# Patient Record
Sex: FEMALE | Race: WHITE | NOT HISPANIC OR LATINO | Employment: FULL TIME | URBAN - NONMETROPOLITAN AREA
[De-identification: names, ages, dates, MRNs, and addresses within clinical notes are randomized per-mention and may not be internally consistent; named-entity substitution may affect disease eponyms.]

---

## 2021-12-28 PROCEDURE — U0004 COV-19 TEST NON-CDC HGH THRU: HCPCS | Performed by: FAMILY MEDICINE

## 2022-01-11 ENCOUNTER — OFFICE VISIT (OUTPATIENT)
Dept: OBSTETRICS AND GYNECOLOGY | Facility: CLINIC | Age: 27
End: 2022-01-11

## 2022-01-11 VITALS
WEIGHT: 153 LBS | BODY MASS INDEX: 25.49 KG/M2 | HEIGHT: 65 IN | SYSTOLIC BLOOD PRESSURE: 110 MMHG | DIASTOLIC BLOOD PRESSURE: 68 MMHG

## 2022-01-11 DIAGNOSIS — Z30.8 ENCOUNTER FOR OTHER CONTRACEPTIVE MANAGEMENT: ICD-10-CM

## 2022-01-11 DIAGNOSIS — N94.6 DYSMENORRHEA: ICD-10-CM

## 2022-01-11 DIAGNOSIS — N92.1 MENORRHAGIA WITH IRREGULAR CYCLE: ICD-10-CM

## 2022-01-11 DIAGNOSIS — N89.8 VAGINAL DISCHARGE: Primary | ICD-10-CM

## 2022-01-11 PROCEDURE — 99204 OFFICE O/P NEW MOD 45 MIN: CPT | Performed by: OBSTETRICS & GYNECOLOGY

## 2022-01-11 RX ORDER — LEVONORGESTREL / ETHINYL ESTRADIOL AND ETHINYL ESTRADIOL 150-30(84)
1 KIT ORAL DAILY
Qty: 84 EACH | Refills: 3 | Status: SHIPPED | OUTPATIENT
Start: 2022-01-11 | End: 2022-07-26 | Stop reason: SDUPTHER

## 2022-01-11 RX ORDER — SERTRALINE HYDROCHLORIDE 100 MG/1
TABLET, FILM COATED ORAL
COMMUNITY
Start: 2022-01-03

## 2022-01-11 RX ORDER — BUSPIRONE HYDROCHLORIDE 5 MG/1
TABLET ORAL
COMMUNITY
Start: 2022-01-03

## 2022-01-12 NOTE — PROGRESS NOTES
Chief Complaint  Vaginal Discharge (patient states that since starting her period, she has had a discharge, concerned if it is normal ) and birthcontrol consult (patient is wanting to start Ashlyna )     History of Present Illness:  Patient is 26 y.o.  who presents to [facility] is a new patient for evaluation of a vaginal discharge as well as contraception.  Patient reports menarche at the age of 15.  She gives a history of menorrhagia with heavy menstrual cycles changing a tampon every 3 hours.  Patient also has complaints of severe pain starting the day prior to and during her menstrual cycle.  Patient reports at times the pain is excruciating in nature.  Patient also reports having vaginal discharge which has been present since menarche.  Patient reports she has been treated for yeast infection in the past.  Patient reports the discharge is intermittent in nature.  She denies any itching or irritation at present.  Patient reports having a Pap smear or cultures 5 years ago.  She denies any odor to the discharge.  Patient sees Dr. Rhodes for her primary care.  Patient is on BuSpar and Zoloft for her anxiety.    History  Past Medical History:   Diagnosis Date   • Anxiety      Current Outpatient Medications on File Prior to Visit   Medication Sig Dispense Refill   • busPIRone (BUSPAR) 5 MG tablet      • sertraline (ZOLOFT) 100 MG tablet        No current facility-administered medications on file prior to visit.     No Known Allergies  Past Surgical History:   Procedure Laterality Date   • TONSILLECTOMY     • WISDOM TOOTH EXTRACTION       History reviewed. No pertinent family history.  Social History     Socioeconomic History   • Marital status: Single   Tobacco Use   • Smoking status: Never Smoker   • Smokeless tobacco: Never Used   Vaping Use   • Vaping Use: Never used   Substance and Sexual Activity   • Alcohol use: Never   • Drug use: Never   • Sexual activity: Yes     Partners:  "Female       Physical Examination:  Vital Signs: /68   Ht 165.1 cm (65\")   Wt 69.4 kg (153 lb)   BMI 25.46 kg/m²     General Appearance: alert, appears stated age, and cooperative  Breasts: Not performed.  Abdomen: no masses, no hepatomegaly, no splenomegaly, soft non-tender, no guarding and no rebound tenderness  Pelvic: Clinical staff was present for exam  External genitalia:  normal appearance of the external genitalia including Bartholin's and [place]'s glands.  :  urethral meatus normal;  Vaginal:  normal pink mucosa without prolapse or lesions. discharge present -  mucousy and clear;  Cervix:  normal appearance.  Uterus:  normal size, shape and consistency.  Adnexa:  normal bimanual exam of the adnexa.  Cultures obtained    Data Review:  The following data was reviewed by: Anna Cooper MD on 01/11/2022:     Labs:    Imaging:    Medical Records:  None    Assessment and Plan   Problem List Items Addressed This Visit     None      Visit Diagnoses     Vaginal discharge    -  Primary  Patient with vaginal discharge as noted.  Cultures are obtained today.  Will await the results of cultures for further treatment.  Patient is also to follow-up for her annual examination.    Relevant Orders    NuSwab VG+ - Swab, Vagina    Encounter for other contraceptive management      Contraceptive counseling was provided.  The various options for contraception was discussed including natural family planning, withdrawal method, barrier methods, spermicides, oral contraception, transdermal patch, vaginal ring, injection, implant, and IUDs.  The risks, complications, failure rates, and benefits of each were discussed.    Relevant Medications    Levonorgest-Eth Estrad 91-Day (Ashlyna) 0.15-0.03 &0.01 MG tablet    Dysmenorrhea      Giovanna Clark was counseled regarding the various etiologies for dysmenorrhea.  The patient was informed that primary dysmenorrhea is painful menstruation in the absence of pathology.  The various " options for dysmenorrhea were discussed to include nonsteroidal antiinflammatory drugs, hormonal suppression, or both.  The patient was informed secondary dysmenorrhea is a result of pelvic pathology and is more common in patients with severe dysmenorrhea at menarche or progressively worsening dysmenorrhea, abnormal uterine bleeding, mid-cycle or acyclic pain, infertility, family history of endometriosis, dyspareunia, or lack of response to empiric therapy.  Evaluation for secondary causes includes pelvic ultrasonography and possible laparoscopy.  The various treatment options for secondary dysmenorrhea depends upon the etiology as discussed.    Relevant Medications    Levonorgest-Eth Estrad 91-Day (Ashlyna) 0.15-0.03 &0.01 MG tablet    Menorrhagia with irregular cycle      The patient was informed that menstrual flow outside of normal volume, duration, regularity, or frequency is considered abnormal uterine bleeding.  The patient was informed that the normal duration of menstrual flow is usually 5 days and the normal cycle typically lasts between 21-35 days.  The patient was informed that heavy menstrual bleeding has been defined as blood loss greater than 80 mL and given that this is hard to quantify excessive blood loss is based on the patient's perception. The patient was informed that AUB most frequently occurs in women aged 19-39 years as a result of pregnancy, structural lesions such as polyps or fibroids, anovulatory cycles, use of hormonal contraception, and endometrial hyperplasia.  She was counseled that endometrial cancer is less common but may occur.  It is recommended that she have a pregnancy test, CBC, and TSH.  Her pap smear needs to be up to date.  She needs screening for Chlamydia if she feels she is at high risk.  It is also recommended that she have a transvaginal ultrasound for further evaluation.  If anatomic abnormalities are noted then then surgery may be indicated. An endometrial ablation  may also be an option to control bleeding in women who have completed childbearing.  The various options were discussed regarding medical management of her bleeding to include the use of nonsteroidal antiinflammatory drugs, progestins, combination oral contraceptives, a levonorgestrel intrauterine device, or tranexamic acid. The patient is informed if there is no improvement in her symptoms with medical management then she will need additional evaluation to include additional laboratory assessment and endometrial sampling.  We'll plan a trial with extended oral contraceptives as discussed.  Instructions and precautions are given.  Patient is to follow-up for her annual examination as well as follow-up of her menorrhagia and dysmenorrhea as discussed.    Relevant Medications    Levonorgest-Eth Estrad 91-Day (Ashlyna) 0.15-0.03 &0.01 MG tablet          Follow Up/Instructions:  Follow up as noted.  Patient was given instructions and counseling regarding her condition or for health maintenance advice. Please see specific information pulled into the AVS if appropriate.     Note: Speech recognition transcription software may have been used to dictate portions of this document.  An attempt at proofreading has been made though minor errors in transcription may still be present.    This note was electronically signed.  Anna Cooper M.D.

## 2022-01-13 LAB
A VAGINAE DNA VAG QL NAA+PROBE: NORMAL SCORE
BVAB2 DNA VAG QL NAA+PROBE: NORMAL SCORE
C ALBICANS DNA VAG QL NAA+PROBE: NEGATIVE
C GLABRATA DNA VAG QL NAA+PROBE: NEGATIVE
C TRACH DNA VAG QL NAA+PROBE: NEGATIVE
MEGA1 DNA VAG QL NAA+PROBE: NORMAL SCORE
N GONORRHOEA DNA VAG QL NAA+PROBE: NEGATIVE
T VAGINALIS DNA VAG QL NAA+PROBE: NEGATIVE

## 2022-07-26 DIAGNOSIS — Z30.8 ENCOUNTER FOR OTHER CONTRACEPTIVE MANAGEMENT: ICD-10-CM

## 2022-07-26 DIAGNOSIS — N94.6 DYSMENORRHEA: ICD-10-CM

## 2022-07-26 DIAGNOSIS — N92.1 MENORRHAGIA WITH IRREGULAR CYCLE: ICD-10-CM

## 2022-07-26 RX ORDER — LEVONORGESTREL / ETHINYL ESTRADIOL AND ETHINYL ESTRADIOL 150-30(84)
1 KIT ORAL DAILY
Qty: 84 EACH | Refills: 3 | Status: SHIPPED | OUTPATIENT
Start: 2022-07-26 | End: 2022-11-04 | Stop reason: SDUPTHER

## 2022-08-15 ENCOUNTER — TELEPHONE (OUTPATIENT)
Dept: OBSTETRICS AND GYNECOLOGY | Facility: CLINIC | Age: 27
End: 2022-08-15

## 2022-08-15 NOTE — TELEPHONE ENCOUNTER
----- Message from Giovanna Clark sent at 8/13/2022  7:37 PM EDT -----  Regarding: Pharmacy   Is there any way you could schedule ZoFran for the nausea I am having to do the birth control? And send it to [facility] in [place].

## 2022-08-16 RX ORDER — ONDANSETRON HYDROCHLORIDE 8 MG/1
8 TABLET, FILM COATED ORAL EVERY 8 HOURS PRN
Qty: 30 TABLET | Refills: 0 | Status: SHIPPED | OUTPATIENT
Start: 2022-08-16 | End: 2023-02-18 | Stop reason: SDUPTHER

## 2022-11-04 DIAGNOSIS — N92.1 MENORRHAGIA WITH IRREGULAR CYCLE: ICD-10-CM

## 2022-11-04 DIAGNOSIS — N94.6 DYSMENORRHEA: ICD-10-CM

## 2022-11-04 DIAGNOSIS — Z30.8 ENCOUNTER FOR OTHER CONTRACEPTIVE MANAGEMENT: ICD-10-CM

## 2022-11-04 RX ORDER — LEVONORGESTREL / ETHINYL ESTRADIOL AND ETHINYL ESTRADIOL 150-30(84)
1 KIT ORAL DAILY
Qty: 84 EACH | Refills: 1 | Status: SHIPPED | OUTPATIENT
Start: 2022-11-04 | End: 2023-02-18 | Stop reason: SDUPTHER

## 2023-02-18 DIAGNOSIS — Z30.8 ENCOUNTER FOR OTHER CONTRACEPTIVE MANAGEMENT: ICD-10-CM

## 2023-02-18 DIAGNOSIS — N94.6 DYSMENORRHEA: ICD-10-CM

## 2023-02-18 DIAGNOSIS — N92.1 MENORRHAGIA WITH IRREGULAR CYCLE: ICD-10-CM

## 2023-02-21 RX ORDER — LEVONORGESTREL / ETHINYL ESTRADIOL AND ETHINYL ESTRADIOL 150-30(84)
1 KIT ORAL DAILY
Qty: 84 EACH | Refills: 1 | Status: SHIPPED | OUTPATIENT
Start: 2023-02-21

## 2023-02-21 RX ORDER — ONDANSETRON HYDROCHLORIDE 8 MG/1
8 TABLET, FILM COATED ORAL EVERY 8 HOURS PRN
Qty: 30 TABLET | Refills: 0 | Status: SHIPPED | OUTPATIENT
Start: 2023-02-21

## 2023-05-16 DIAGNOSIS — N94.6 DYSMENORRHEA: ICD-10-CM

## 2023-05-16 DIAGNOSIS — N92.1 MENORRHAGIA WITH IRREGULAR CYCLE: ICD-10-CM

## 2023-05-16 DIAGNOSIS — Z30.8 ENCOUNTER FOR OTHER CONTRACEPTIVE MANAGEMENT: ICD-10-CM

## 2023-05-16 RX ORDER — LEVONORGESTREL / ETHINYL ESTRADIOL AND ETHINYL ESTRADIOL 150-30(84)
1 KIT ORAL DAILY
Qty: 84 EACH | Refills: 1 | OUTPATIENT
Start: 2023-05-16

## 2023-05-16 RX ORDER — ONDANSETRON HYDROCHLORIDE 8 MG/1
8 TABLET, FILM COATED ORAL EVERY 8 HOURS PRN
Qty: 30 TABLET | Refills: 0 | OUTPATIENT
Start: 2023-05-16

## 2023-05-17 DIAGNOSIS — N94.6 DYSMENORRHEA: ICD-10-CM

## 2023-05-17 DIAGNOSIS — Z30.8 ENCOUNTER FOR OTHER CONTRACEPTIVE MANAGEMENT: ICD-10-CM

## 2023-05-17 DIAGNOSIS — N92.1 MENORRHAGIA WITH IRREGULAR CYCLE: ICD-10-CM

## 2023-05-17 RX ORDER — LEVONORGESTREL / ETHINYL ESTRADIOL AND ETHINYL ESTRADIOL 150-30(84)
1 KIT ORAL DAILY
Qty: 84 EACH | Refills: 0 | Status: SHIPPED | OUTPATIENT
Start: 2023-05-17

## 2023-05-17 NOTE — TELEPHONE ENCOUNTER
PROVIDER: DR. FABIAN    Caller: Giovanna Clark    Relationship: Self    Best call back number: [phone]    Requested Prescriptions:   Requested Prescriptions     Pending Prescriptions Disp Refills   • Levonorgest-Eth Estrad 91-Day (Ashlyna) 0.15-0.03 &0.01 MG tablet 84 each 1     Sig: Take 1 each by mouth Daily.        Pharmacy where request should be sent:    [facility] [address] [place] [address]#[phone]    Last office visit with prescribing clinician: 1/11/2022   Last telemedicine visit with prescribing clinician: 5/16/2023   Next office visit with prescribing clinician: 7-28-23    Additional details provided by patient: PT HAS 6 OF HER PILLS LEFT, SHE SCHEDULED ANNUAL FIRST AVAIL ON 7-28-23    Does the patient have less than a 3 day supply:  [] Yes  [x] No    Would you like a call back once the refill request has been completed: [] Yes [x] No    If the office needs to give you a call back, can they leave a voicemail: [x] Yes [] No    Farhad Carpenter Rep   05/17/23 09:01 EDT

## 2025-01-15 ENCOUNTER — PREP FOR SURGERY (OUTPATIENT)
Dept: OTHER | Facility: HOSPITAL | Age: 30
End: 2025-01-15
Payer: COMMERCIAL

## 2025-01-15 ENCOUNTER — OFFICE VISIT (OUTPATIENT)
Dept: OBSTETRICS AND GYNECOLOGY | Facility: CLINIC | Age: 30
End: 2025-01-15
Payer: COMMERCIAL

## 2025-01-15 VITALS
HEIGHT: 65 IN | WEIGHT: 122.2 LBS | BODY MASS INDEX: 20.36 KG/M2 | DIASTOLIC BLOOD PRESSURE: 68 MMHG | SYSTOLIC BLOOD PRESSURE: 100 MMHG

## 2025-01-15 DIAGNOSIS — Z30.2 REQUEST FOR STERILIZATION: Primary | ICD-10-CM

## 2025-01-15 DIAGNOSIS — N93.9 ABNORMAL UTERINE BLEEDING (AUB): ICD-10-CM

## 2025-01-15 DIAGNOSIS — Z12.4 SCREENING FOR CERVICAL CANCER: ICD-10-CM

## 2025-01-15 RX ORDER — SODIUM CHLORIDE 0.9 % (FLUSH) 0.9 %
3 SYRINGE (ML) INJECTION EVERY 12 HOURS SCHEDULED
OUTPATIENT
Start: 2025-01-15

## 2025-01-15 RX ORDER — SODIUM CHLORIDE 9 MG/ML
40 INJECTION, SOLUTION INTRAVENOUS AS NEEDED
OUTPATIENT
Start: 2025-01-15

## 2025-01-15 RX ORDER — SODIUM CHLORIDE 0.9 % (FLUSH) 0.9 %
10 SYRINGE (ML) INJECTION AS NEEDED
OUTPATIENT
Start: 2025-01-15

## 2025-01-15 NOTE — PROGRESS NOTES
GYN Office Visit    Subjective   Chief Complaint   Patient presents with    Consult     Patient is requesting a tubal. Patient would like a pap smear done today.      Giovanna Clark is a 29 y.o.  presenting to discuss a tubal ligation. She does not desire future fertility. She does have a history of heavy periods and was previously on OCPs. She is no longer on them and reports her periods have been more manageable. They are monthly/ predictable with 9-10 days of bleeding. They start out heavy the first 7 days and then taper off. She reports significant cramping/ pain with them as well. This is overall an improvement from her previous bleeding profile.     OB Hx: G0  Pap smear: unsure when last was, none on file. Completed today.  Mammogram: N/A  Colonoscopy: N/A  DEXA Scan: N/A    Past Medical History:   Diagnosis Date    Anxiety     Depression      Past Surgical History:   Procedure Laterality Date    TONSILLECTOMY      WISDOM TOOTH EXTRACTION       Family History   Problem Relation Age of Onset    Breast cancer Maternal Grandmother     Ovarian cancer Maternal Grandmother     Diabetes Maternal Grandmother       Social History     Tobacco Use    Smoking status: Never    Smokeless tobacco: Never   Vaping Use    Vaping status: Never Used   Substance Use Topics    Alcohol use: Never    Drug use: Never     No Known Allergies    Current Outpatient Medications on File Prior to Visit   Medication Sig Dispense Refill    busPIRone (BUSPAR) 5 MG tablet       ondansetron (ZOFRAN) 8 MG tablet Take 1 tablet by mouth Every 8 (Eight) Hours As Needed for Nausea or Vomiting. 30 tablet 0    sertraline (ZOLOFT) 100 MG tablet       [DISCONTINUED] Levonorgest-Eth Estrad 91-Day (Ashlyna) 0.15-0.03 &0.01 MG tablet Take 1 each by mouth Daily. 84 each 0     No current facility-administered medications on file prior to visit.     Social History    Tobacco Use      Smoking status: Never      Smokeless tobacco: Never       Objective   BP  "100/68   Ht 165.1 cm (65\")   Wt 55.4 kg (122 lb 3.2 oz)   LMP 12/10/2024 (Approximate)   BMI 20.34 kg/m²     Physical Exam:  General Appearance: alert, interactive, and cooperative  Pelvis:  Pelvic: Clinical staff was present for exam  External genitalia:  normal appearance of the external genitalia including Bartholin's and [place]'s glands.  :  urethral meatus normal  Vagina:  normal pink mucosa without prolapse or lesions, scant blood in vault  Cervix:  normal appearance, no lesions       Medical Decision Making:    I reviewed Giovanna's last visit in our office 1/11/22.    Assessment & Plan      Diagnosis Plan   1. Request for sterilization        2. Abnormal uterine bleeding (AUB)        3. Screening for cervical cancer  LIQUID-BASED PAP SMEAR WITH HPV GENOTYPING IF ASCUS (PATRICIA,COR,[facility]AD)        Medication Management: None    Procedures Performed: Pap    We reviewed the permanent nature of tubal sterilization, as well as the non-surgical, reversible options. We reviewed the small persistent chance of pregnancy after tubal ligation, in which case there is an increased risk of ectopic pregnancy. We discussed the surgical plan for laparoscopic bilateral salpignectomy to decrease the lifetime risk of ovarian/ primary peritoneal cancer. Finally, we discussed that tubal ligation is not therapeutic for AUB/ dysmenorrhea, and that hormonal contraception may be better suited for these concerns. After reviewing all options, Giovanna does still wish to proceed with BTL. Prep for case placed. Pap obtained due to unknown last Pap/ history. All questions answered.    RTC for post-op visit.    Wanda Aguilar MD  Obstetrics and Gynecology  [facility]  "

## 2025-01-17 LAB — REF LAB TEST METHOD: NORMAL

## 2025-02-15 ENCOUNTER — PATIENT MESSAGE (OUTPATIENT)
Dept: OBSTETRICS AND GYNECOLOGY | Facility: CLINIC | Age: 30
End: 2025-02-15
Payer: COMMERCIAL